# Patient Record
Sex: MALE | Race: WHITE | NOT HISPANIC OR LATINO | URBAN - METROPOLITAN AREA
[De-identification: names, ages, dates, MRNs, and addresses within clinical notes are randomized per-mention and may not be internally consistent; named-entity substitution may affect disease eponyms.]

---

## 2019-01-29 ENCOUNTER — APPOINTMENT (OUTPATIENT)
Dept: RADIOLOGY | Facility: MEDICAL CENTER | Age: 44
End: 2019-01-29
Attending: EMERGENCY MEDICINE
Payer: MEDICAID

## 2019-01-29 ENCOUNTER — HOSPITAL ENCOUNTER (OUTPATIENT)
Facility: MEDICAL CENTER | Age: 44
End: 2019-02-01
Attending: EMERGENCY MEDICINE | Admitting: HOSPITALIST
Payer: MEDICAID

## 2019-01-29 DIAGNOSIS — R07.9 CHEST PAIN, UNSPECIFIED TYPE: ICD-10-CM

## 2019-01-29 LAB
ALBUMIN SERPL BCP-MCNC: 3.9 G/DL (ref 3.2–4.9)
ALBUMIN/GLOB SERPL: 1.5 G/DL
ALP SERPL-CCNC: 141 U/L (ref 30–99)
ALT SERPL-CCNC: 33 U/L (ref 2–50)
ANION GAP SERPL CALC-SCNC: 13 MMOL/L (ref 0–11.9)
APTT PPP: 26.7 SEC (ref 24.7–36)
AST SERPL-CCNC: 38 U/L (ref 12–45)
BASOPHILS # BLD AUTO: 0.8 % (ref 0–1.8)
BASOPHILS # BLD: 0.05 K/UL (ref 0–0.12)
BILIRUB SERPL-MCNC: 0.7 MG/DL (ref 0.1–1.5)
BUN SERPL-MCNC: 31 MG/DL (ref 8–22)
CALCIUM SERPL-MCNC: 8.7 MG/DL (ref 8.5–10.5)
CHLORIDE SERPL-SCNC: 100 MMOL/L (ref 96–112)
CO2 SERPL-SCNC: 20 MMOL/L (ref 20–33)
CREAT SERPL-MCNC: 1.06 MG/DL (ref 0.5–1.4)
EOSINOPHIL # BLD AUTO: 0.07 K/UL (ref 0–0.51)
EOSINOPHIL NFR BLD: 1.1 % (ref 0–6.9)
ERYTHROCYTE [DISTWIDTH] IN BLOOD BY AUTOMATED COUNT: 43.8 FL (ref 35.9–50)
GLOBULIN SER CALC-MCNC: 2.6 G/DL (ref 1.9–3.5)
GLUCOSE SERPL-MCNC: 213 MG/DL (ref 65–99)
HCT VFR BLD AUTO: 48.8 % (ref 42–52)
HGB BLD-MCNC: 15.8 G/DL (ref 14–18)
IMM GRANULOCYTES # BLD AUTO: 0.01 K/UL (ref 0–0.11)
IMM GRANULOCYTES NFR BLD AUTO: 0.2 % (ref 0–0.9)
INR PPP: 1.36 (ref 0.87–1.13)
LIPASE SERPL-CCNC: 40 U/L (ref 11–82)
LYMPHOCYTES # BLD AUTO: 1.85 K/UL (ref 1–4.8)
LYMPHOCYTES NFR BLD: 29.5 % (ref 22–41)
MCH RBC QN AUTO: 31.2 PG (ref 27–33)
MCHC RBC AUTO-ENTMCNC: 32.4 G/DL (ref 33.7–35.3)
MCV RBC AUTO: 96.3 FL (ref 81.4–97.8)
MONOCYTES # BLD AUTO: 0.7 K/UL (ref 0–0.85)
MONOCYTES NFR BLD AUTO: 11.1 % (ref 0–13.4)
NEUTROPHILS # BLD AUTO: 3.6 K/UL (ref 1.82–7.42)
NEUTROPHILS NFR BLD: 57.3 % (ref 44–72)
NRBC # BLD AUTO: 0 K/UL
NRBC BLD-RTO: 0 /100 WBC
PLATELET # BLD AUTO: 184 K/UL (ref 164–446)
PMV BLD AUTO: 10.2 FL (ref 9–12.9)
POTASSIUM SERPL-SCNC: 4.5 MMOL/L (ref 3.6–5.5)
PROT SERPL-MCNC: 6.5 G/DL (ref 6–8.2)
PROTHROMBIN TIME: 16.9 SEC (ref 12–14.6)
RBC # BLD AUTO: 5.07 M/UL (ref 4.7–6.1)
SODIUM SERPL-SCNC: 133 MMOL/L (ref 135–145)
TROPONIN I SERPL-MCNC: 0.01 NG/ML (ref 0–0.04)
WBC # BLD AUTO: 6.3 K/UL (ref 4.8–10.8)

## 2019-01-29 PROCEDURE — 99285 EMERGENCY DEPT VISIT HI MDM: CPT

## 2019-01-29 PROCEDURE — 83880 ASSAY OF NATRIURETIC PEPTIDE: CPT

## 2019-01-29 PROCEDURE — 85025 COMPLETE CBC W/AUTO DIFF WBC: CPT

## 2019-01-29 PROCEDURE — 85730 THROMBOPLASTIN TIME PARTIAL: CPT

## 2019-01-29 PROCEDURE — 80053 COMPREHEN METABOLIC PANEL: CPT

## 2019-01-29 PROCEDURE — 85610 PROTHROMBIN TIME: CPT

## 2019-01-29 PROCEDURE — 93005 ELECTROCARDIOGRAM TRACING: CPT

## 2019-01-29 PROCEDURE — 84484 ASSAY OF TROPONIN QUANT: CPT

## 2019-01-29 PROCEDURE — 93005 ELECTROCARDIOGRAM TRACING: CPT | Performed by: EMERGENCY MEDICINE

## 2019-01-29 PROCEDURE — 71045 X-RAY EXAM CHEST 1 VIEW: CPT

## 2019-01-29 PROCEDURE — 83690 ASSAY OF LIPASE: CPT

## 2019-01-29 ASSESSMENT — LIFESTYLE VARIABLES: DO YOU DRINK ALCOHOL: NO

## 2019-01-29 ASSESSMENT — PAIN DESCRIPTION - DESCRIPTORS
DESCRIPTORS: PRESSURE
DESCRIPTORS: PRESSURE

## 2019-01-30 ENCOUNTER — APPOINTMENT (OUTPATIENT)
Dept: RADIOLOGY | Facility: MEDICAL CENTER | Age: 44
End: 2019-01-30
Attending: INTERNAL MEDICINE
Payer: MEDICAID

## 2019-01-30 PROBLEM — E78.5 HLD (HYPERLIPIDEMIA): Status: ACTIVE | Noted: 2019-01-30

## 2019-01-30 PROBLEM — I95.9 HYPOTENSION: Status: ACTIVE | Noted: 2019-01-30

## 2019-01-30 PROBLEM — R07.9 PAIN IN THE CHEST: Status: ACTIVE | Noted: 2019-01-30

## 2019-01-30 PROBLEM — I50.9 CHF (CONGESTIVE HEART FAILURE) (HCC): Status: ACTIVE | Noted: 2019-01-30

## 2019-01-30 LAB
BNP SERPL-MCNC: 3438 PG/ML (ref 0–100)
EKG IMPRESSION: NORMAL
GLUCOSE BLD-MCNC: 210 MG/DL (ref 65–99)
GLUCOSE BLD-MCNC: 273 MG/DL (ref 65–99)
GLUCOSE BLD-MCNC: 317 MG/DL (ref 65–99)
TROPONIN I SERPL-MCNC: 0.01 NG/ML (ref 0–0.04)
TROPONIN I SERPL-MCNC: <0.01 NG/ML (ref 0–0.04)

## 2019-01-30 PROCEDURE — G0378 HOSPITAL OBSERVATION PER HR: HCPCS

## 2019-01-30 PROCEDURE — 96372 THER/PROPH/DIAG INJ SC/IM: CPT

## 2019-01-30 PROCEDURE — A9270 NON-COVERED ITEM OR SERVICE: HCPCS | Performed by: HOSPITALIST

## 2019-01-30 PROCEDURE — 93005 ELECTROCARDIOGRAM TRACING: CPT | Performed by: HOSPITALIST

## 2019-01-30 PROCEDURE — 82962 GLUCOSE BLOOD TEST: CPT | Mod: 91

## 2019-01-30 PROCEDURE — A9270 NON-COVERED ITEM OR SERVICE: HCPCS | Performed by: INTERNAL MEDICINE

## 2019-01-30 PROCEDURE — 84484 ASSAY OF TROPONIN QUANT: CPT

## 2019-01-30 PROCEDURE — 36415 COLL VENOUS BLD VENIPUNCTURE: CPT

## 2019-01-30 PROCEDURE — 700102 HCHG RX REV CODE 250 W/ 637 OVERRIDE(OP): Performed by: HOSPITALIST

## 2019-01-30 PROCEDURE — 99220 PR INITIAL OBSERVATION CARE,LEVL III: CPT | Performed by: HOSPITALIST

## 2019-01-30 PROCEDURE — 71275 CT ANGIOGRAPHY CHEST: CPT

## 2019-01-30 PROCEDURE — 700102 HCHG RX REV CODE 250 W/ 637 OVERRIDE(OP): Performed by: INTERNAL MEDICINE

## 2019-01-30 PROCEDURE — 700117 HCHG RX CONTRAST REV CODE 255: Performed by: INTERNAL MEDICINE

## 2019-01-30 RX ORDER — POLYETHYLENE GLYCOL 3350 17 G/17G
1 POWDER, FOR SOLUTION ORAL
Status: DISCONTINUED | OUTPATIENT
Start: 2019-01-30 | End: 2019-02-01 | Stop reason: HOSPADM

## 2019-01-30 RX ORDER — SPIRONOLACTONE 25 MG/1
12.5-25 TABLET ORAL
COMMUNITY

## 2019-01-30 RX ORDER — ATORVASTATIN CALCIUM 10 MG/1
10 TABLET, FILM COATED ORAL NIGHTLY
COMMUNITY

## 2019-01-30 RX ORDER — BISACODYL 10 MG
10 SUPPOSITORY, RECTAL RECTAL
Status: DISCONTINUED | OUTPATIENT
Start: 2019-01-30 | End: 2019-02-01 | Stop reason: HOSPADM

## 2019-01-30 RX ORDER — CARVEDILOL 3.12 MG/1
3.12 TABLET ORAL 2 TIMES DAILY WITH MEALS
COMMUNITY

## 2019-01-30 RX ORDER — FUROSEMIDE 40 MG/1
40 TABLET ORAL
COMMUNITY

## 2019-01-30 RX ORDER — DEXTROSE MONOHYDRATE 25 G/50ML
25 INJECTION, SOLUTION INTRAVENOUS
Status: DISCONTINUED | OUTPATIENT
Start: 2019-01-30 | End: 2019-02-01 | Stop reason: HOSPADM

## 2019-01-30 RX ORDER — ATORVASTATIN CALCIUM 80 MG/1
80 TABLET, FILM COATED ORAL EVERY EVENING
Status: DISCONTINUED | OUTPATIENT
Start: 2019-01-30 | End: 2019-02-01 | Stop reason: HOSPADM

## 2019-01-30 RX ORDER — NITROGLYCERIN 0.4 MG/1
0.4 TABLET SUBLINGUAL
Status: DISCONTINUED | OUTPATIENT
Start: 2019-01-30 | End: 2019-02-01 | Stop reason: HOSPADM

## 2019-01-30 RX ORDER — POTASSIUM CHLORIDE 20 MEQ/1
20 TABLET, EXTENDED RELEASE ORAL 2 TIMES DAILY
Status: ON HOLD | COMMUNITY
End: 2019-01-31

## 2019-01-30 RX ORDER — MORPHINE SULFATE 4 MG/ML
2-4 INJECTION, SOLUTION INTRAMUSCULAR; INTRAVENOUS
Status: DISCONTINUED | OUTPATIENT
Start: 2019-01-30 | End: 2019-02-01 | Stop reason: HOSPADM

## 2019-01-30 RX ORDER — AMOXICILLIN 250 MG
2 CAPSULE ORAL 2 TIMES DAILY
Status: DISCONTINUED | OUTPATIENT
Start: 2019-01-30 | End: 2019-02-01 | Stop reason: HOSPADM

## 2019-01-30 RX ADMIN — RIVAROXABAN 15 MG: 15 TABLET, FILM COATED ORAL at 14:31

## 2019-01-30 RX ADMIN — IOHEXOL 100 ML: 350 INJECTION, SOLUTION INTRAVENOUS at 13:47

## 2019-01-30 RX ADMIN — SENNOSIDES AND DOCUSATE SODIUM 2 TABLET: 8.6; 5 TABLET ORAL at 20:46

## 2019-01-30 RX ADMIN — INSULIN HUMAN 3 UNITS: 100 INJECTION, SOLUTION PARENTERAL at 17:00

## 2019-01-30 RX ADMIN — ATORVASTATIN CALCIUM 80 MG: 80 TABLET, FILM COATED ORAL at 17:59

## 2019-01-30 RX ADMIN — INSULIN HUMAN 4 UNITS: 100 INJECTION, SOLUTION PARENTERAL at 13:16

## 2019-01-30 RX ADMIN — INSULIN HUMAN 2 UNITS: 100 INJECTION, SOLUTION PARENTERAL at 20:50

## 2019-01-30 ASSESSMENT — ENCOUNTER SYMPTOMS
PALPITATIONS: 0
MYALGIAS: 0
PHOTOPHOBIA: 0
FEVER: 0
WHEEZING: 0
FOCAL WEAKNESS: 0
ABDOMINAL PAIN: 0
CHILLS: 0
DIARRHEA: 0
ROS GI COMMENTS: ABDOMINAL BLOATING
DIZZINESS: 0
TINGLING: 0
NAUSEA: 0
COUGH: 1
VOMITING: 0
HEADACHES: 0
SHORTNESS OF BREATH: 1
SORE THROAT: 0
DEPRESSION: 0

## 2019-01-30 ASSESSMENT — PATIENT HEALTH QUESTIONNAIRE - PHQ9
7. TROUBLE CONCENTRATING ON THINGS, SUCH AS READING THE NEWSPAPER OR WATCHING TELEVISION: SEVERAL DAYS
2. FEELING DOWN, DEPRESSED, IRRITABLE, OR HOPELESS: NEARLY EVERY DAY
9. THOUGHTS THAT YOU WOULD BE BETTER OFF DEAD, OR OF HURTING YOURSELF: NOT AT ALL
6. FEELING BAD ABOUT YOURSELF - OR THAT YOU ARE A FAILURE OR HAVE LET YOURSELF OR YOUR FAMILY DOWN: NEARLY EVERY DAY
4. FEELING TIRED OR HAVING LITTLE ENERGY: NEARLY EVERY DAY
5. POOR APPETITE OR OVEREATING: MORE THAN HALF THE DAYS
SUM OF ALL RESPONSES TO PHQ QUESTIONS 1-9: 21
3. TROUBLE FALLING OR STAYING ASLEEP OR SLEEPING TOO MUCH: NEARLY EVERY DAY
1. LITTLE INTEREST OR PLEASURE IN DOING THINGS: NEARLY EVERY DAY
SUM OF ALL RESPONSES TO PHQ9 QUESTIONS 1 AND 2: 6
8. MOVING OR SPEAKING SO SLOWLY THAT OTHER PEOPLE COULD HAVE NOTICED. OR THE OPPOSITE, BEING SO FIGETY OR RESTLESS THAT YOU HAVE BEEN MOVING AROUND A LOT MORE THAN USUAL: NEARLY EVERY DAY

## 2019-01-30 ASSESSMENT — LIFESTYLE VARIABLES: EVER_SMOKED: YES

## 2019-01-30 NOTE — ED NOTES
Pt to Room T833 with Anya OTERO.  Pt belongings sent with patient, up on 2L NC and cardiac monitor.  Pt well appearing, nad.

## 2019-01-30 NOTE — ED NOTES
Pt reports taking metformin BID 1000 mg , recent accucheck 273.  To call pharmacy/MD Regarding medication regimen.   Pt resting in cart, resp even and unlabored, skin w/d/i.

## 2019-01-30 NOTE — ASSESSMENT & PLAN NOTE
To be hypotensive on admission and diuretics were held.  We will restart Lasix as blood pressure is stable  He has evidence of peritoneal and pleural effusion, mild, as well as lower extremity edema, mild  CHF does not appear to be in exacerbation  Pending echo and outside cardiology records  Patient  will need to follow-up with cardiology at [place] after discharge

## 2019-01-30 NOTE — ED NOTES
Inpatient Hospitalist RN at bedside to examine patient.  EKG IP at bedside per Q4H inpatient order.  Pt resting in cart, no change in condition.

## 2019-01-30 NOTE — PROGRESS NOTES
43-year-old male admitted to the hospital this morning with complaint of chest pain.  I evaluated and examined him at the bedside.  He reported that his chest pain has improved and he had a stress test done 2 weeks ago and [facility].  He has AICD placement.  He reported shortness of breath and found to have hypoxia currently on 2 L oxygen.  I ordered CTA pulmonary and he found to have bilateral pulmonary embolism.  I started him on Xarelto.  I requested outside medical record so we can evaluate him further.  His current troponin x2 is negative so far.  He is outpatient aspirin and I discontinued it as I started him on Xarelto.

## 2019-01-30 NOTE — ED NOTES
Hospitalist at bedside.  Pt updated on plan of care (CTPE).  Insulin administered subcut verified with second RN Wayne per protocol.  Pt has no other complaints at present.

## 2019-01-30 NOTE — PROGRESS NOTES
Attending Hospitalist today is  starting at 0700. Please call this physician for orders, updates and questions. Thank you.

## 2019-01-30 NOTE — ED NOTES
Med rec complete per pt's rx bottles at bedside and [facility] to verify lipitor strength. meds returned to pt. He will give medications to family once they arrive to the hospital. DEVENDRA.

## 2019-01-30 NOTE — ASSESSMENT & PLAN NOTE
History of alcoholic cardiomyopathy, AICD implantation  CTA of pulmonary artery positive for PE  Initially hypoxic, resolved.  We will follow with heart ultrasound to make further determination of the management plan  Currently chest pain-free  Unlikely CAD.  Unclear if he had angiography done.  Will await outside medical records faxed  Nitro and morphine as needed for pain

## 2019-01-30 NOTE — H&P
Hospital Medicine History & Physical Note    Date of Service  1/30/2019    Primary Care Physician  No primary care provider on file.    Consultants  None    Code Status  Full    Chief Complaint  Chief Complaint   Patient presents with   • Chest Pressure     heaviness    • Abdominal Swelling   • Shortness of Breath       History of Presenting Illness  43 y.o. male who presented on 1/29/2019 with shortness of breath, chest heaviness, and abdominal bloating.  I do not have any records to confirm however patient states that he has a history of alcoholic cardiomyopathy with associated congestive heart failure and AICD implantation.  He states that for the last several weeks, he has had a centralized chest pressure which has been radiating toward his back and left arm.  He reportedly was seen at [facility] 2 weeks ago and underwent a stress test which was normal.  He states that despite this, his symptoms persisted therefore he returned to [facility] in the last several days and was discharged from the emergency room.  He describes a centralized chest pressure which is nonradiating currently, associated with shortness of breath and diaphoresis but no nausea, lightheadedness, or palpitations.  The patient reports worsening swelling of his legs and feet since he was started on Entresto and that he has had some abdominal bloating.  Upon assessment in the emergency room, the patient is noted to be hypotensive.    Review of Systems  Review of Systems   Constitutional: Negative for chills and fever.   HENT: Negative for congestion and sore throat.    Eyes: Negative for photophobia.   Respiratory: Positive for cough and shortness of breath. Negative for wheezing.    Cardiovascular: Positive for chest pain and leg swelling. Negative for palpitations.   Gastrointestinal: Negative for abdominal pain, diarrhea, nausea and vomiting.        Abdominal bloating   Genitourinary: Negative for dysuria.   Musculoskeletal:  Negative for myalgias.   Skin: Negative.    Neurological: Negative for dizziness, tingling, focal weakness and headaches.   Psychiatric/Behavioral: Negative for depression and suicidal ideas.       Past Medical History  Past Medical History:   Diagnosis Date   • AICD (automatic cardioverter/defibrillator) present    • CAD (coronary artery disease)    • CHF (congestive heart failure) (HCC)    • Diabetes (HCC)     type 2   • High cholesterol    • Hypertension        Surgical History  Past Surgical History:   Procedure Laterality Date   • AICD IMPLANT         Family History  History reviewed. No pertinent family history.    Social History  Social History   Substance Use Topics   • Smoking status: Former Smoker   • Smokeless tobacco: Never Used   • Alcohol use No      Comment: former       Allergies  No Known Allergies    Medications  No current facility-administered medications on file prior to encounter.      No current outpatient prescriptions on file prior to encounter.       Physical Exam  Hemodynamics  Temp (24hrs), Av.3 °C (97.3 °F), Min:36.3 °C (97.3 °F), Max:36.3 °C (97.3 °F)   Temperature: 36.3 °C (97.3 °F)  Pulse  Av.8  Min: 87  Max: 98 Heart Rate (Monitored): 94  Blood Pressure: 115/78, NIBP: (!) 93/72      Respiratory      Respiration: 18, Pulse Oximetry: 97 %             Physical Exam   Constitutional: He is oriented to person, place, and time. No distress.   HENT:   Head: Normocephalic and atraumatic.   Right Ear: External ear normal.   Left Ear: External ear normal.   Eyes: EOM are normal. Right eye exhibits no discharge. Left eye exhibits no discharge.   Neck: Neck supple. No JVD present.   Cardiovascular: Normal rate, regular rhythm and normal heart sounds.    Pulmonary/Chest: Effort normal and breath sounds normal. No respiratory distress. He exhibits no tenderness.   Abdominal: Soft. Bowel sounds are normal. He exhibits no distension. There is no tenderness.   Musculoskeletal: He exhibits no  edema.   Neurological: He is alert and oriented to person, place, and time. No cranial nerve deficit.   Skin: Skin is dry. He is not diaphoretic. No erythema.   Psychiatric: He has a normal mood and affect. His behavior is normal.   Nursing note and vitals reviewed.    Capillary refill less than 3 seconds, distal pulses intact    Laboratory:  Recent Labs      01/29/19 2247   WBC  6.3   RBC  5.07   HEMOGLOBIN  15.8   HEMATOCRIT  48.8   MCV  96.3   MCH  31.2   MCHC  32.4*   RDW  43.8   PLATELETCT  184   MPV  10.2     Recent Labs      01/29/19 2247   SODIUM  133*   POTASSIUM  4.5   CHLORIDE  100   CO2  20   GLUCOSE  213*   BUN  31*   CREATININE  1.06   CALCIUM  8.7     Recent Labs      01/29/19 2247   ALTSGPT  33   ASTSGOT  38   ALKPHOSPHAT  141*   TBILIRUBIN  0.7   LIPASE  40   GLUCOSE  213*     Recent Labs      01/29/19 2247   APTT  26.7   INR  1.36*     Recent Labs      01/29/19 2247   BNPBTYPENAT  3438*         Lab Results   Component Value Date    TROPONINI 0.01 01/29/2019       Imaging  Dx-chest-limited (1 View)    Result Date: 1/29/2019 1/29/2019 11:03 PM HISTORY/REASON FOR EXAM:  Chest Pain TECHNIQUE/EXAM DESCRIPTION AND NUMBER OF VIEWS: Single portable view of the chest. COMPARISON: None FINDINGS: Left-sided pacemaker defibrillator No pulmonary infiltrates or consolidations are noted. No pleural effusion. No pneumothorax. Mildly prominent cardiopericardial silhouette.     No acute cardiopulmonary abnormalities are identified.        Assessment/Plan:  Anticipate that patient will need less than 2 midnights for management of the discussed medical issues.    * Pain in the chest   Assessment & Plan    Patient was noted to be hypotensive at the time of presentation, he also states that he was seen at [facility] 2 times within the last several weeks and during 1 of his visits had undergone a stress test which was negative.  We are obtaining the records to confirm this.  He also states that he is followed  by Dr. Conde of cardiology at [place] for alcoholic cardiomyopathy and has related congestive heart failure with AICD implantation.  I do not have these records to confirm but again we are obtaining records from [place].  Since he just had a workup at the outside facility, I will plan to monitor him overnight on telemetry and correct his hypotension.  I will trend troponin levels and obtain serial EKGs.  I have started him on a statin and an aspirin and he will receive as needed morphine, oxygen, and nitroglycerin.  We will plan to call [facility] cardiology in the morning to see if anything further needs to be done for this patient.     CHF (congestive heart failure) (HCC)   Assessment & Plan    Holding all diuretics in light of hypotension, minimal edema noted on physical exam.     HLD (hyperlipidemia)   Assessment & Plan    Continue statin and aspirin.  Workup as noted above.     Hypotension   Assessment & Plan    Patient has been on diuretics as well as Entresto for his history of CHF.  He was hypotensive at the time of presentation.  He complains of abdominal bloating and lower extremity edema however this is minimal at best.  I am holding all antihypertensives for tonight and monitoring his blood pressure.  We will need to confirm his home medication list and then begin to resume his medications once his blood pressure has recovered.         Prophylaxis: Sequential compression devices for DVT prophylaxis, no PPI indicated, bowel protocol as needed

## 2019-01-30 NOTE — ED NOTES
Pt alert and oriented, on 2 L NC.   Resp even and unlabored, skin w/d.  Updated on bed assignment and  CT results.   Pharmacy at bedside to discuss heparin plan with RNjessy notify Hospitalist.

## 2019-01-30 NOTE — ED NOTES
Pt placed on 2L NC for comfort/intermittent pulse ox 86%. Pt reports feeling mildly SOB.  Hospitalist informed of patient missing metformin dose, requesting sliding scale insulin.   Pt alert/awake, nad. Call light within reach.

## 2019-01-30 NOTE — ED PROVIDER NOTES
ED Provider Note    CHIEF COMPLAINT  Chief Complaint   Patient presents with   • Chest Pressure     heaviness    • Abdominal Swelling   • Shortness of Breath       HPI  Michael Hassan is a 43 y.o. male who presents with chest pressure.  The patient states he said heaviness in his chest over the last several weeks.  He states it seems to be worse with exerting himself and he does have associated diaphoresis, nausea, and dyspnea.  The patient states he did have a stress test at [facility] a couple weeks ago that was negative.  He states he was diagnosed with an alcohol induced cardiomyopathy and has a known history of congestive heart failure.  He currently has a pacemaker in place.  He states he also has swelling to his lower extremities as well as some abdominal distention.  The patient states he does continue to abuse alcohol.  The patient states his pain is currently very mild.  He states the pain is in the substernal region and described as pressure.  He states that does have an exertional component and does get better with rest.  He also takes daily aspirin and did have aspirin prior to arrival.    REVIEW OF SYSTEMS  See HPI for further details. All other systems are negative.     PAST MEDICAL HISTORY  Past Medical History:   Diagnosis Date   • AICD (automatic cardioverter/defibrillator) present    • CAD (coronary artery disease)    • CHF (congestive heart failure) ([facility])    • Diabetes ([facility])     type 2   • High cholesterol    • Hypertension        FAMILY HISTORY  [unfilled]    SOCIAL HISTORY  Social History     Social History   • Marital status: Single     Spouse name: N/A   • Number of children: N/A   • Years of education: N/A     Social History Main Topics   • Smoking status: Former Smoker   • Smokeless tobacco: Never Used   • Alcohol use No      Comment: former   • Drug use: No   • Sexual activity: Not on file     Other Topics Concern   • Not on file     Social History Narrative   • No narrative on file        SURGICAL HISTORY  Past Surgical History:   Procedure Laterality Date   • AICD IMPLANT         CURRENT MEDICATIONS  Home Medications    **Home medications have not yet been reviewed for this encounter**         ALLERGIES  No Known Allergies    PHYSICAL EXAM  VITAL SIGNS: /78   Pulse 89   Temp 36.3 °C (97.3 °F) (Temporal)   Resp 20   Ht 1.524 m (5')   Wt 86 kg (189 lb 9.5 oz)   SpO2 95%   BMI 37.03 kg/m²  Room air O2: 99    Constitutional: Chronically ill in appearance  HENT: Normocephalic, Atraumatic, Bilateral external ears normal, Oropharynx moist, No oral exudates, Nose normal.   Eyes: PERRLA, EOMI, Conjunctiva normal, No discharge.   Neck: Normal range of motion, No tenderness, Supple, No stridor.   Lymphatic: No lymphadenopathy noted.   Cardiovascular: Normal heart rate, Normal rhythm, No murmurs, No rubs, No gallops.   Thorax & Lungs: Slightly diminished throughout, no respiratory distress, No wheezing, No chest tenderness.   Abdomen: Hypoactive bowel sounds, hepatomegaly, no rebound, no guarding  Skin: Warm, Dry, No erythema, No rash.   Back: No tenderness, No CVA tenderness.   Extremities: Intact distal pulses, slight lower extremity edema, No tenderness, No cyanosis, No clubbing.   Musculoskeletal: Good range of motion in all major joints. No tenderness to palpation or major deformities noted.   Neurologic: Alert & oriented x 3, Normal motor function, Normal sensory function, No focal deficits noted.   Psychiatric: Affect normal, Judgment normal, Mood normal.     EKG  Twelve-lead EKG interpreted by myself shows a normal sinus rhythm with a ventricular rate of 98, QRS is poor R wave progression, no ST segment elevation or depression, T waves inverted laterally in 1 and aVL    RADIOLOGY/PROCEDURES  DX-CHEST-LIMITED (1 VIEW)   Final Result         No acute cardiopulmonary abnormalities are identified.            COURSE & MEDICAL DECISION MAKING  Pertinent Labs & Imaging studies reviewed. (See  chart for details)  This a 43-year-old male who presents with chest pain.  The patient does have significant cardiac risk factors and already has a known alcohol induced cardiomyopathy.  The EKG and troponin does not support ischemia.  He does have a BNP of 3438 which is concerning from a heart failure standpoint.  However the patient's blood pressure is only 88 systolic therefore be difficult to manage his volume state.  The patient states he does make urine.  His BUN and creatinine is slightly elevated as well.  As for his abdominal distention his abdomen is benign therefore surgical source would be unlikely.  I suspect he does have a mild amount of ascites.  He also some lower extremity edema which goes along with his heart failure.  The patient be admitted to the hospital for an echocardiogram and further cardiac rule out.  He is currently resting comfortably.  Otherwise the chest x-ray does not show any evidence of a focal process such as pneumonia.    FINAL IMPRESSION  1.  Chest pain rule out acute coronary syndrome  2.  Congestive heart failure exacerbation  3.  Uncontrolled diabetes mellitus    Disposition  The patient will be admitted in stable condition         Electronically signed by: Vadim Glaser, 1/30/2019 12:01 AM

## 2019-01-30 NOTE — ED TRIAGE NOTES
Benji Hassan  43 y.o.  male  Chief Complaint   Patient presents with   • Chest Pressure     heaviness    • Abdominal Swelling   • Shortness of Breath     Present to triage c/o chest heaviness since Friday and abdominal and lower extremity swelling / edema. Hx CHF. Patient on AICD. Patient was admitted at [facility] 10 days ago and was DC'd a week ago. Came back to [facility] last Wednesday due to left sided chest pain that radiates to left arm but was DC's in ER stated that all labs are fine.

## 2019-01-31 ENCOUNTER — APPOINTMENT (OUTPATIENT)
Dept: RADIOLOGY | Facility: MEDICAL CENTER | Age: 44
End: 2019-01-31
Attending: INTERNAL MEDICINE
Payer: MEDICAID

## 2019-01-31 ENCOUNTER — APPOINTMENT (OUTPATIENT)
Dept: CARDIOLOGY | Facility: MEDICAL CENTER | Age: 44
End: 2019-01-31
Attending: INTERNAL MEDICINE
Payer: MEDICAID

## 2019-01-31 PROBLEM — R14.0 ABDOMINAL DISTENTION: Status: ACTIVE | Noted: 2019-01-31

## 2019-01-31 PROBLEM — J96.01 ACUTE RESPIRATORY FAILURE WITH HYPOXIA (HCC): Status: ACTIVE | Noted: 2019-01-31

## 2019-01-31 PROBLEM — I26.99 ACUTE PULMONARY EMBOLISM (HCC): Status: ACTIVE | Noted: 2019-01-31

## 2019-01-31 LAB
ANION GAP SERPL CALC-SCNC: 8 MMOL/L (ref 0–11.9)
BUN SERPL-MCNC: 21 MG/DL (ref 8–22)
CALCIUM SERPL-MCNC: 9.5 MG/DL (ref 8.5–10.5)
CHLORIDE SERPL-SCNC: 100 MMOL/L (ref 96–112)
CO2 SERPL-SCNC: 25 MMOL/L (ref 20–33)
CREAT SERPL-MCNC: 0.92 MG/DL (ref 0.5–1.4)
EKG IMPRESSION: NORMAL
EKG IMPRESSION: NORMAL
GLUCOSE BLD-MCNC: 155 MG/DL (ref 65–99)
GLUCOSE BLD-MCNC: 233 MG/DL (ref 65–99)
GLUCOSE BLD-MCNC: 244 MG/DL (ref 65–99)
GLUCOSE BLD-MCNC: 250 MG/DL (ref 65–99)
GLUCOSE SERPL-MCNC: 130 MG/DL (ref 65–99)
LV EJECT FRACT  99904: 15
LV EJECT FRACT MOD 2C 99903: 9.26
LV EJECT FRACT MOD 4C 99902: 21.83
LV EJECT FRACT MOD BP 99901: 15.68
MAGNESIUM SERPL-MCNC: 2.1 MG/DL (ref 1.5–2.5)
POTASSIUM SERPL-SCNC: 4.4 MMOL/L (ref 3.6–5.5)
SODIUM SERPL-SCNC: 133 MMOL/L (ref 135–145)

## 2019-01-31 PROCEDURE — 93306 TTE W/DOPPLER COMPLETE: CPT

## 2019-01-31 PROCEDURE — 93010 ELECTROCARDIOGRAM REPORT: CPT | Performed by: INTERNAL MEDICINE

## 2019-01-31 PROCEDURE — 82962 GLUCOSE BLOOD TEST: CPT | Mod: 91

## 2019-01-31 PROCEDURE — 80048 BASIC METABOLIC PNL TOTAL CA: CPT

## 2019-01-31 PROCEDURE — 700102 HCHG RX REV CODE 250 W/ 637 OVERRIDE(OP): Performed by: INTERNAL MEDICINE

## 2019-01-31 PROCEDURE — A9270 NON-COVERED ITEM OR SERVICE: HCPCS | Performed by: INTERNAL MEDICINE

## 2019-01-31 PROCEDURE — 51798 US URINE CAPACITY MEASURE: CPT

## 2019-01-31 PROCEDURE — 36415 COLL VENOUS BLD VENIPUNCTURE: CPT

## 2019-01-31 PROCEDURE — 99233 SBSQ HOSP IP/OBS HIGH 50: CPT | Performed by: INTERNAL MEDICINE

## 2019-01-31 PROCEDURE — 93005 ELECTROCARDIOGRAM TRACING: CPT | Performed by: INTERNAL MEDICINE

## 2019-01-31 PROCEDURE — 96372 THER/PROPH/DIAG INJ SC/IM: CPT

## 2019-01-31 PROCEDURE — 700102 HCHG RX REV CODE 250 W/ 637 OVERRIDE(OP): Performed by: HOSPITALIST

## 2019-01-31 PROCEDURE — 93306 TTE W/DOPPLER COMPLETE: CPT | Mod: 26 | Performed by: INTERNAL MEDICINE

## 2019-01-31 PROCEDURE — G0378 HOSPITAL OBSERVATION PER HR: HCPCS

## 2019-01-31 PROCEDURE — 83735 ASSAY OF MAGNESIUM: CPT

## 2019-01-31 PROCEDURE — 76700 US EXAM ABDOM COMPLETE: CPT

## 2019-01-31 PROCEDURE — A9270 NON-COVERED ITEM OR SERVICE: HCPCS | Performed by: HOSPITALIST

## 2019-01-31 RX ORDER — CARVEDILOL 3.12 MG/1
3.12 TABLET ORAL 2 TIMES DAILY WITH MEALS
Status: DISCONTINUED | OUTPATIENT
Start: 2019-01-31 | End: 2019-02-01 | Stop reason: HOSPADM

## 2019-01-31 RX ORDER — FUROSEMIDE 40 MG/1
40 TABLET ORAL
Status: DISCONTINUED | OUTPATIENT
Start: 2019-01-31 | End: 2019-02-01 | Stop reason: HOSPADM

## 2019-01-31 RX ADMIN — ATORVASTATIN CALCIUM 80 MG: 80 TABLET, FILM COATED ORAL at 18:31

## 2019-01-31 RX ADMIN — FUROSEMIDE 40 MG: 40 TABLET ORAL at 11:55

## 2019-01-31 RX ADMIN — INSULIN HUMAN 2 UNITS: 100 INJECTION, SOLUTION PARENTERAL at 18:38

## 2019-01-31 RX ADMIN — INSULIN HUMAN 2 UNITS: 100 INJECTION, SOLUTION PARENTERAL at 21:48

## 2019-01-31 RX ADMIN — CARVEDILOL 3.12 MG: 3.12 TABLET, FILM COATED ORAL at 18:31

## 2019-01-31 RX ADMIN — RIVAROXABAN 15 MG: 15 TABLET, FILM COATED ORAL at 18:31

## 2019-01-31 RX ADMIN — RIVAROXABAN 15 MG: 15 TABLET, FILM COATED ORAL at 05:46

## 2019-01-31 RX ADMIN — INSULIN HUMAN 2 UNITS: 100 INJECTION, SOLUTION PARENTERAL at 11:58

## 2019-01-31 ASSESSMENT — ENCOUNTER SYMPTOMS
TINGLING: 0
HEARTBURN: 0
BLURRED VISION: 0
PALPITATIONS: 0
HEMOPTYSIS: 0
ABDOMINAL PAIN: 1
ORTHOPNEA: 0
DIZZINESS: 0
WEIGHT LOSS: 0
DOUBLE VISION: 0
BRUISES/BLEEDS EASILY: 0
BACK PAIN: 0
HEADACHES: 0
DEPRESSION: 0
MYALGIAS: 0
VOMITING: 0
FEVER: 0
CHILLS: 0
NECK PAIN: 0
NAUSEA: 0
PHOTOPHOBIA: 0
COUGH: 0
POLYDIPSIA: 0
SHORTNESS OF BREATH: 1
SPUTUM PRODUCTION: 0
WHEEZING: 0

## 2019-01-31 ASSESSMENT — LIFESTYLE VARIABLES: SUBSTANCE_ABUSE: 0

## 2019-01-31 NOTE — ASSESSMENT & PLAN NOTE
Secondary to above.  Improving.  Currently on room air.  Continue Lasix and incentive spirometry.  Xarelto for PE

## 2019-01-31 NOTE — ASSESSMENT & PLAN NOTE
Probably secondary to worsening of ascites, secondary to cirrhosis and/or CHF  Ordered abdominal ultrasound

## 2019-01-31 NOTE — ASSESSMENT & PLAN NOTE
LV/RV ratio 0.9  Will await heart ultrasound to evaluate for right ventricular strain  Started on Xarelto.  Preauthorization pending  Hypoxia resolved

## 2019-01-31 NOTE — PROGRESS NOTES
[facility] Medicine Daily Progress Note    Date of Service  1/31/2019    Chief Complaint  43 y.o. male with distant history of alcohol abuse, alcoholic cardiomyopathy admitted 1/29/2019 with complaints of shortness of breath, chest pressure, abdominal swelling, leg swelling      Interval Problem Update  Patient was diagnosed with PE yesterday.  Started on Xarelto.  Heart ultrasound is currently pending.  We do not know previous ejection fraction, as patient was followed at [place] cardiology.  Currently waiting to get medical papers from [facility]  Currently denies chest pressure.  Hypoxia on admission appears to be resolved by now.  Transient tachycardia was noted.  Will order repeat EKG.  Xarelto prescriptions sent out for preauthorization  Patient expressed concern about bilateral flank discomfort and abdominal distention.  According to him he has history of alcoholic liver cirrhosis.  Ordered abdominal ultrasound to assess for ascites or other abnormality      Consultants/Specialty  None    Code Status  Full code    Disposition  Home when medically clear    Review of Systems  Review of Systems   Constitutional: Negative for chills, fever and weight loss.   HENT: Negative for ear pain, hearing loss and tinnitus.    Eyes: Negative for blurred vision, double vision and photophobia.   Respiratory: Positive for shortness of breath. Negative for cough, hemoptysis, sputum production and wheezing.    Cardiovascular: Positive for chest pain and leg swelling. Negative for palpitations and orthopnea.   Gastrointestinal: Positive for abdominal pain. Negative for heartburn, nausea and vomiting.   Genitourinary: Negative for dysuria, frequency and urgency.   Musculoskeletal: Negative for back pain, myalgias and neck pain.   Skin: Negative for itching and rash.   Neurological: Negative for dizziness, tingling and headaches.   Endo/Heme/Allergies: Negative for environmental allergies and polydipsia. Does not bruise/bleed  easily.   Psychiatric/Behavioral: Negative for depression, substance abuse and suicidal ideas.        Physical Exam  Temp:  [36.6 °C (97.9 °F)-36.8 °C (98.2 °F)] 36.6 °C (97.9 °F)  Pulse:  [] 114  Resp:  [16-20] 20  BP: (100-131)/(74-95) 131/95  SpO2:  [94 %-98 %] 94 %    Physical Exam   Constitutional: He is oriented to person, place, and time. He appears well-developed.   HENT:   Head: Normocephalic and atraumatic.   Eyes: Pupils are equal, round, and reactive to light. EOM are normal.   Neck: Normal range of motion. Neck supple.   Cardiovascular: Normal rate.    Pulmonary/Chest: Effort normal. No respiratory distress. He has decreased breath sounds.   Abdominal: Soft. He exhibits distension. There is tenderness. There is no rebound and no guarding.   Musculoskeletal: Normal range of motion. He exhibits edema.   Neurological: He is alert and oriented to person, place, and time.   Skin: Skin is dry.   Psychiatric: His mood appears anxious. He exhibits a depressed mood.   Nursing note and vitals reviewed.      Fluids    Intake/Output Summary (Last 24 hours) at 01/31/19 1542  Last data filed at 01/31/19 0900   Gross per 24 hour   Intake              240 ml   Output                0 ml   Net              240 ml       Laboratory  Recent Labs      01/29/19 2247   WBC  6.3   RBC  5.07   HEMOGLOBIN  15.8   HEMATOCRIT  48.8   MCV  96.3   MCH  31.2   MCHC  32.4*   RDW  43.8   PLATELETCT  184   MPV  10.2     Recent Labs      01/29/19 2247 01/31/19   0234   SODIUM  133*  133*   POTASSIUM  4.5  4.4   CHLORIDE  100  100   CO2  20  25   GLUCOSE  213*  130*   BUN  31*  21   CREATININE  1.06  0.92   CALCIUM  8.7  9.5     Recent Labs      01/29/19 2247   APTT  26.7   INR  1.36*     Recent Labs      01/29/19 2247   BNPBTYPENAT  3438*           Imaging  CT-CTA CHEST PULMONARY ARTERY W/ RECONS   Final Result   Addendum 1 of 1   These findings were discussed with SHAKEEL HARRIS on 1/30/2019 2:06    PM.      Final       1.  Small bilateral lower lobe pulmonary emboli in the posterior basal subsegmental pulmonary artery branches.   2.  Small bilateral pleural effusions with associated atelectasis, worse in the LEFT.   3.  LEFT ventricular enlargement.   4.  Pacemaker in place.   5.  Mild mediastinal adenopathy, nonspecific.   6.  Small amount of ascites noted in the upper abdomen.            DX-CHEST-LIMITED (1 VIEW)   Final Result         No acute cardiopulmonary abnormalities are identified.      US-ABDOMEN COMPLETE SURVEY    (Results Pending)   EC-ECHOCARDIOGRAM COMPLETE W/O CONT    (Results Pending)        Assessment/Plan  * Pain in the chest   Assessment & Plan    History of alcoholic cardiomyopathy, AICD implantation  CTA of pulmonary artery positive for PE  Initially hypoxic, resolved.  We will follow with heart ultrasound to make further determination of the management plan  Currently chest pain-free  Unlikely CAD.  Unclear if he had angiography done.  Will await outside medical records faxed  Nitro and morphine as needed for pain     Acute respiratory failure with hypoxia (HCC)   Assessment & Plan    Secondary to above.  Improving.  Currently on room air.  Continue Lasix and incentive spirometry.  Xarelto for PE     Acute pulmonary embolism (HCC)   Assessment & Plan    LV/RV ratio 0.9  Will await heart ultrasound to evaluate for right ventricular strain  Started on Xarelto.  Preauthorization pending  Hypoxia resolved     Abdominal distention   Assessment & Plan    Probably secondary to worsening of ascites, secondary to cirrhosis and/or CHF  Ordered abdominal ultrasound     CHF (congestive heart failure) (HCC)   Assessment & Plan    To be hypotensive on admission and diuretics were held.  We will restart Lasix as blood pressure is stable  He has evidence of peritoneal and pleural effusion, mild, as well as lower extremity edema, mild  CHF does not appear to be in exacerbation  Pending echo and outside cardiology  records  Patient  will need to follow-up with cardiology at [place] after discharge     HLD (hyperlipidemia)   Assessment & Plan    Continue statin     Hypotension   Assessment & Plan    Blood pressure stable.  Medications restarted  Monitor blood pressure          VTE prophylaxis: On Xarelto

## 2019-01-31 NOTE — CARE PLAN
Problem: Communication  Goal: The ability to communicate needs accurately and effectively will improve  Outcome: PROGRESSING AS EXPECTED    Intervention: [place] patient and significant other/support system to call light to alert staff of needs   01/30/19 5517   OTHER   Oriented to: All of the Following : Location of Bathroom, Visiting Policy, Unit Routine, Call Light and Bedside Controls, Bedside Rail Policy, Smoking Policy, Rights and Responsibilities, Bedside Report, and Patient Education Notebook         Problem: Knowledge Deficit  Goal: Knowledge of disease process/condition, treatment plan, diagnostic tests, and medications will improve  Outcome: PROGRESSING AS EXPECTED  Patient educated about disease process and plan of care for the shift. Patient expressed understanding. All questions answered at this time.

## 2019-02-01 ENCOUNTER — APPOINTMENT (OUTPATIENT)
Dept: RADIOLOGY | Facility: MEDICAL CENTER | Age: 44
End: 2019-02-01
Attending: HOSPITALIST
Payer: MEDICAID

## 2019-02-01 ENCOUNTER — PATIENT OUTREACH (OUTPATIENT)
Dept: HEALTH INFORMATION MANAGEMENT | Facility: OTHER | Age: 44
End: 2019-02-01

## 2019-02-01 VITALS
WEIGHT: 186.51 LBS | HEIGHT: 60 IN | RESPIRATION RATE: 15 BRPM | TEMPERATURE: 98.7 F | HEART RATE: 96 BPM | SYSTOLIC BLOOD PRESSURE: 109 MMHG | BODY MASS INDEX: 36.62 KG/M2 | DIASTOLIC BLOOD PRESSURE: 83 MMHG | OXYGEN SATURATION: 93 %

## 2019-02-01 LAB
ALBUMIN SERPL BCP-MCNC: 3.6 G/DL (ref 3.2–4.9)
ALBUMIN/GLOB SERPL: 1.4 G/DL
ALP SERPL-CCNC: 141 U/L (ref 30–99)
ALT SERPL-CCNC: 25 U/L (ref 2–50)
ANION GAP SERPL CALC-SCNC: 9 MMOL/L (ref 0–11.9)
APPEARANCE UR: CLEAR
AST SERPL-CCNC: 28 U/L (ref 12–45)
BACTERIA #/AREA URNS HPF: NEGATIVE /HPF
BASOPHILS # BLD AUTO: 0.6 % (ref 0–1.8)
BASOPHILS # BLD: 0.03 K/UL (ref 0–0.12)
BILIRUB SERPL-MCNC: 0.7 MG/DL (ref 0.1–1.5)
BILIRUB UR QL STRIP.AUTO: NEGATIVE
BUN SERPL-MCNC: 21 MG/DL (ref 8–22)
CALCIUM SERPL-MCNC: 8.8 MG/DL (ref 8.5–10.5)
CHLORIDE SERPL-SCNC: 101 MMOL/L (ref 96–112)
CO2 SERPL-SCNC: 24 MMOL/L (ref 20–33)
COLOR UR: YELLOW
CREAT SERPL-MCNC: 0.85 MG/DL (ref 0.5–1.4)
EOSINOPHIL # BLD AUTO: 0.11 K/UL (ref 0–0.51)
EOSINOPHIL NFR BLD: 2.1 % (ref 0–6.9)
EPI CELLS #/AREA URNS HPF: NEGATIVE /HPF
ERYTHROCYTE [DISTWIDTH] IN BLOOD BY AUTOMATED COUNT: 43.4 FL (ref 35.9–50)
GLOBULIN SER CALC-MCNC: 2.5 G/DL (ref 1.9–3.5)
GLUCOSE BLD-MCNC: 150 MG/DL (ref 65–99)
GLUCOSE SERPL-MCNC: 120 MG/DL (ref 65–99)
GLUCOSE UR STRIP.AUTO-MCNC: NEGATIVE MG/DL
HCT VFR BLD AUTO: 47.3 % (ref 42–52)
HGB BLD-MCNC: 15.2 G/DL (ref 14–18)
HYALINE CASTS #/AREA URNS LPF: ABNORMAL /LPF
IMM GRANULOCYTES # BLD AUTO: 0.01 K/UL (ref 0–0.11)
IMM GRANULOCYTES NFR BLD AUTO: 0.2 % (ref 0–0.9)
KETONES UR STRIP.AUTO-MCNC: NEGATIVE MG/DL
LEUKOCYTE ESTERASE UR QL STRIP.AUTO: NEGATIVE
LYMPHOCYTES # BLD AUTO: 1.35 K/UL (ref 1–4.8)
LYMPHOCYTES NFR BLD: 25.9 % (ref 22–41)
MAGNESIUM SERPL-MCNC: 1.9 MG/DL (ref 1.5–2.5)
MCH RBC QN AUTO: 31 PG (ref 27–33)
MCHC RBC AUTO-ENTMCNC: 32.1 G/DL (ref 33.7–35.3)
MCV RBC AUTO: 96.5 FL (ref 81.4–97.8)
MICRO URNS: ABNORMAL
MONOCYTES # BLD AUTO: 0.78 K/UL (ref 0–0.85)
MONOCYTES NFR BLD AUTO: 14.9 % (ref 0–13.4)
NEUTROPHILS # BLD AUTO: 2.94 K/UL (ref 1.82–7.42)
NEUTROPHILS NFR BLD: 56.3 % (ref 44–72)
NITRITE UR QL STRIP.AUTO: NEGATIVE
NRBC # BLD AUTO: 0 K/UL
NRBC BLD-RTO: 0 /100 WBC
PH UR STRIP.AUTO: 5 [PH]
PHOSPHATE SERPL-MCNC: 4.2 MG/DL (ref 2.5–4.5)
PLATELET # BLD AUTO: 169 K/UL (ref 164–446)
PMV BLD AUTO: 9.9 FL (ref 9–12.9)
POTASSIUM SERPL-SCNC: 4.4 MMOL/L (ref 3.6–5.5)
PROT SERPL-MCNC: 6.1 G/DL (ref 6–8.2)
PROT UR QL STRIP: 30 MG/DL
RBC # BLD AUTO: 4.9 M/UL (ref 4.7–6.1)
RBC # URNS HPF: ABNORMAL /HPF
RBC UR QL AUTO: NEGATIVE
SODIUM SERPL-SCNC: 134 MMOL/L (ref 135–145)
SP GR UR STRIP.AUTO: 1.01
UROBILINOGEN UR STRIP.AUTO-MCNC: 1 MG/DL
WBC # BLD AUTO: 5.2 K/UL (ref 4.8–10.8)
WBC #/AREA URNS HPF: ABNORMAL /HPF

## 2019-02-01 PROCEDURE — A9270 NON-COVERED ITEM OR SERVICE: HCPCS | Performed by: HOSPITALIST

## 2019-02-01 PROCEDURE — 700102 HCHG RX REV CODE 250 W/ 637 OVERRIDE(OP): Performed by: INTERNAL MEDICINE

## 2019-02-01 PROCEDURE — A9270 NON-COVERED ITEM OR SERVICE: HCPCS | Performed by: INTERNAL MEDICINE

## 2019-02-01 PROCEDURE — 83735 ASSAY OF MAGNESIUM: CPT

## 2019-02-01 PROCEDURE — 84100 ASSAY OF PHOSPHORUS: CPT

## 2019-02-01 PROCEDURE — 82962 GLUCOSE BLOOD TEST: CPT

## 2019-02-01 PROCEDURE — 700102 HCHG RX REV CODE 250 W/ 637 OVERRIDE(OP): Performed by: HOSPITALIST

## 2019-02-01 PROCEDURE — 85025 COMPLETE CBC W/AUTO DIFF WBC: CPT

## 2019-02-01 PROCEDURE — 74018 RADEX ABDOMEN 1 VIEW: CPT

## 2019-02-01 PROCEDURE — 99217 PR OBSERVATION CARE DISCHARGE: CPT | Performed by: INTERNAL MEDICINE

## 2019-02-01 PROCEDURE — G0378 HOSPITAL OBSERVATION PER HR: HCPCS

## 2019-02-01 PROCEDURE — 80053 COMPREHEN METABOLIC PANEL: CPT

## 2019-02-01 PROCEDURE — 81001 URINALYSIS AUTO W/SCOPE: CPT

## 2019-02-01 PROCEDURE — 36415 COLL VENOUS BLD VENIPUNCTURE: CPT

## 2019-02-01 RX ORDER — CALCIUM CARBONATE 500 MG/1
500 TABLET, CHEWABLE ORAL EVERY 12 HOURS PRN
Status: DISCONTINUED | OUTPATIENT
Start: 2019-02-01 | End: 2019-02-01 | Stop reason: HOSPADM

## 2019-02-01 RX ADMIN — FUROSEMIDE 40 MG: 40 TABLET ORAL at 05:47

## 2019-02-01 RX ADMIN — CARVEDILOL 3.12 MG: 3.12 TABLET, FILM COATED ORAL at 15:09

## 2019-02-01 RX ADMIN — SENNOSIDES AND DOCUSATE SODIUM 2 TABLET: 8.6; 5 TABLET ORAL at 05:47

## 2019-02-01 RX ADMIN — RIVAROXABAN 15 MG: 15 TABLET, FILM COATED ORAL at 15:10

## 2019-02-01 RX ADMIN — ATORVASTATIN CALCIUM 80 MG: 80 TABLET, FILM COATED ORAL at 15:09

## 2019-02-01 RX ADMIN — RIVAROXABAN 15 MG: 15 TABLET, FILM COATED ORAL at 05:47

## 2019-02-01 RX ADMIN — CARVEDILOL 3.12 MG: 3.12 TABLET, FILM COATED ORAL at 05:47

## 2019-02-01 RX ADMIN — ANTACID TABLETS 500 MG: 500 TABLET, CHEWABLE ORAL at 01:42

## 2019-02-01 NOTE — DISCHARGE INSTRUCTIONS
Discharge Instructions    Discharged to home by car with relative. Discharged via wheelchair, hospital escort: Yes.  Special equipment needed: Not Applicable    Be sure to schedule a follow-up appointment with your primary care doctor or any specialists as instructed.     Discharge Plan:   Diet Plan: Discussed  Activity Level: Discussed  Confirmed Follow up Appointment: Patient to Call and Schedule Appointment  Confirmed Symptoms Management: Discussed  Medication Reconciliation Updated: Yes  Pneumococcal Vaccine Administered/Refused: Not given - Patient refused pneumococcal vaccine  Influenza Vaccine Indication: Patient Refuses    I understand that a diet low in cholesterol, fat, and sodium is recommended for good health. Unless I have been given specific instructions below for another diet, I accept this instruction as my diet prescription.   Other diet: Heart healthy    Special Instructions: None    · Is patient discharged on Warfarin / Coumadin?   No         Pulmonary Embolism  A pulmonary embolism (PE) is a sudden blockage or decrease of blood flow in one lung or both lungs. Most blockages come from a blood clot that travels from the legs or the pelvis to the lungs. PE is a dangerous and potentially life-threatening condition if it is not treated right away.  What are the causes?  A pulmonary embolism occurs most commonly when a blood clot travels from one of your veins to your lungs. Rarely, PE is caused by air, fat, amniotic fluid, or part of a tumor traveling through your veins to your lungs.  What increases the risk?  A PE is more likely to develop in:  · People who smoke.  · People who are older, especially over 60 years of age.  · People who are overweight (obese).  · People who sit or lie still for a long time, such as during long-distance travel (over 4 hours), bed rest, hospitalization, or during recovery from certain medical conditions like a stroke.  · People who do not engage in much physical  activity (sedentary lifestyle).  · People who have chronic breathing disorders.  · People who have a personal or family history of blood clots or blood clotting disease.  · People who have peripheral vascular disease (PVD), diabetes, or some types of cancer.  · People who have heart disease, especially if the person had a recent heart attack or has congestive heart failure.  · People who have neurological diseases that affect the legs (leg paresis).  · People who have had a traumatic injury, such as breaking a hip or leg.  · People who have recently had major or lengthy surgery, especially on the hip, knee, or abdomen.  · People who have had a central line placed inside a large vein.  · People who take medicines that contain the hormone estrogen. These include birth control pills and hormone replacement therapy.  · Pregnancy or during childbirth or the postpartum period.  What are the signs or symptoms?  The symptoms of a PE usually start suddenly and include:  · Shortness of breath while active or at rest.  · Coughing or coughing up blood or blood-tinged mucus.  · Chest pain that is often worse with deep breaths.  · Rapid or irregular heartbeat.  · Feeling light-headed or dizzy.  · Fainting.  · Feeling anxious.  · Sweating.  There may also be pain and swelling in a leg if that is where the blood clot started.  These symptoms may represent a serious problem that is an emergency. Do not wait to see if the symptoms will go away. Get medical help right away. Call your local emergency services (911 in the [place] Do not drive yourself to the hospital.   How is this diagnosed?  Your health care provider will take a medical history and perform a physical exam. You may also have other tests, including:  · Blood tests to assess the clotting properties of your blood, assess oxygen levels in your blood, and find blood clots.  · Imaging tests, such as CT, ultrasound, MRI, X-ray, and other tests to see if you have clots anywhere  in your body.  · An electrocardiogram (ECG) to look for heart strain from blood clots in the lungs.  How is this treated?  The main goals of PE treatment are:  · To stop a blood clot from growing larger.  · To stop new blood clots from forming.  The type of treatment that you receive depends on many factors, such as the cause of your PE, your risk for bleeding or developing more clots, and other medical conditions that you have. Sometimes, a combination of treatments is necessary.  This condition may be treated with:  · Medicines, including newer oral blood thinners (anticoagulants), warfarin, low molecular weight heparins, thrombolytics, or heparins.  · Wearing compression stockings or using different types of devices.  · Surgery (rare) to remove the blood clot or to place a filter in your abdomen to stop the blood clot from traveling to your lungs.  Treatments for a PE are often divided into immediate treatment, long-term treatment (up to 3 months after PE), and extended treatment (more than 3 months after PE). Your treatment may continue for several months. This is called maintenance therapy, and it is used to prevent the forming of new blood clots. You can work with your health care provider to choose the treatment program that is best for you.  What are anticoagulants?   Anticoagulants are medicines that treat PEs. They can stop current blood clots from growing and stop new clots from forming. They cannot dissolve existing clots. Your body dissolves clots by itself over time. Anticoagulants are given by mouth, by injection, or through an IV tube.  What are thrombolytics?   Thrombolytics are clot-dissolving medicines that are used to dissolve a PE. They carry a high risk of bleeding, so they tend to be used only in severe cases or if you have very low blood pressure.  Follow these instructions at home:  If you are taking a newer oral anticoagulant:  · Take the medicine every single day at the same time each  day.  · Understand what foods and drugs interact with this medicine.  · Understand that there are no regular blood tests required when using this medicine.  · Understand the side effects of this medicine, including excessive bruising or bleeding. Ask your health care provider or pharmacist about other possible side effects.  If you are taking warfarin:  · Understand how to take warfarin and know which foods can affect how warfarin works in your body.  · Understand that it is dangerous to take too much or too little warfarin. Too much warfarin increases the risk of bleeding. Too little warfarin continues to allow the risk for blood clots.  · Follow your PT and INR blood testing schedule. The PT and INR results allow your health care provider to adjust your dose of warfarin. It is very important that you have your PT and INR tested as often as told by your health care provider.  · Avoid major changes in your diet, or tell your health care provider before you change your diet. Arrange a visit with a registered dietitian to answer your questions. Many foods, especially foods that are high in vitamin K, can interfere with warfarin and affect the PT and INR results. Eat a consistent amount of foods that are high in vitamin K, such as:  ¨ Spinach, kale, broccoli, cabbage, sejal greens, turnip greens, [place] sprouts, peas, cauliflower, seaweed, and parsley.  ¨ Beef liver and pork liver.  ¨ Green tea.  ¨ Soybean oil.  · Tell your health care provider about any and all medicines, vitamins, and supplements that you take, including aspirin and other over-the-counter anti-inflammatory medicines. Be especially cautious with aspirin and anti-inflammatory medicines. Do not take those before you ask your health care provider if it is safe to do so. This is important because many medicines can interfere with warfarin and affect the PT and INR results.  · Do not start or stop taking any over-the-counter or prescription medicine  unless your health care provider or pharmacist tells you to do so.  If you take warfarin, you will also need to do these things:  · Hold pressure over cuts for longer than usual.  · Tell your dentist and other health care providers that you are taking warfarin before you have any procedures in which bleeding may occur.  · Avoid alcohol or drink very small amounts. Tell your health care provider if you change your alcohol intake.  · Do not use tobacco products, including cigarettes, chewing tobacco, and e-cigarettes. If you need help quitting, ask your health care provider.  · Avoid contact sports.  General instructions  · Take over-the-counter and prescription medicines only as told by your health care provider. Anticoagulant medicines can have side effects, including easy bruising and difficulty stopping bleeding. If you are prescribed an anticoagulant, you will also need to do these things:  ¨ Hold pressure over cuts for longer than usual.  ¨ Tell your dentist and other health care providers that you are taking anticoagulants before you have any procedures in which bleeding may occur.  ¨ Avoid contact sports.  · Wear a medical alert bracelet or carry a medical alert card that says you have had a PE.  · Ask your health care provider how soon you can go back to your normal activities. Stay active to prevent new blood clots from forming.  · Make sure to exercise while traveling or when you have been sitting or standing for a long period of time. It is very important to exercise. Exercise your legs by walking or by tightening and relaxing your leg muscles often. Take frequent walks.  · Wear compression stockings as told by your health care provider to help prevent more blood clots from forming.  · Do not use tobacco products, including cigarettes, chewing tobacco, and e-cigarettes. If you need help quitting, ask your health care provider.  · Keep all follow-up appointments with your health care provider. This is  important.  How is this prevented?  Take these actions to decrease your risk of developing another PE:  · Exercise regularly. For at least 30 minutes every day, engage in:  ¨ Activity that involves moving your arms and legs.  ¨ Activity that encourages good blood flow through your body by increasing your heart rate.  · Exercise your arms and legs every hour during long-distance travel (over 4 hours). Drink plenty of water and avoid drinking alcohol while traveling.  · Avoid sitting or lying in bed for long periods of time without moving your legs.  · Maintain a weight that is appropriate for your height. Ask your health care provider what weight is healthy for you.  · If you are a woman who is over 35 years of age, avoid unnecessary use of medicines that contain estrogen. These include birth control pills.  · Do not smoke, especially if you take estrogen medicines. If you need help quitting, ask your health care provider.  · If you are at very high risk for PE, wear compression stockings.  · If you recently had a PE, have regularly scheduled ultrasound testing on your legs to check for new blood clots.  If you are hospitalized, prevention measures may include:  · Early walking after surgery, as soon as your health care provider says that it is safe.  · Receiving anticoagulants to prevent blood clots. If you cannot take anticoagulants, other options may be available, such as wearing compression stockings or using different types of devices.  Get help right away if:  · You have new or increased pain, swelling, or redness in an arm or leg.  · You have numbness or tingling in an arm or leg.  · You have shortness of breath while active or at rest.  · You have chest pain.  · You have a rapid or irregular heartbeat.  · You feel light-headed or dizzy.  · You cough up blood.  · You notice blood in your vomit, bowel movement, or urine.  · You have a fever.  These symptoms may represent a serious problem that is an emergency.  Do not wait to see if the symptoms will go away. Get medical help right away. Call your local emergency services (911 in the [place] Do not drive yourself to the hospital.   This information is not intended to replace advice given to you by your health care provider. Make sure you discuss any questions you have with your health care provider.  Document Released: 12/15/2001 Document Revised: 05/25/2017 Document Reviewed: 04/13/2016  [employer] Interactive Patient Education © 2017 [employer]      Depression / Suicide Risk    As you are discharged from this [facility] facility, it is important to learn how to keep safe from harming yourself.    Recognize the warning signs:  · Abrupt changes in personality, positive or negative- including increase in energy   · Giving away possessions  · Change in eating patterns- significant weight changes-  positive or negative  · Change in sleeping patterns- unable to sleep or sleeping all the time   · Unwillingness or inability to communicate  · Depression  · Unusual sadness, discouragement and loneliness  · Talk of wanting to die  · Neglect of personal appearance   · Rebelliousness- reckless behavior  · Withdrawal from people/activities they love  · Confusion- inability to concentrate     If you or a loved one observes any of these behaviors or has concerns about self-harm, here's what you can do:  · Talk about it- your feelings and reasons for harming yourself  · Remove any means that you might use to hurt yourself (examples: pills, rope, extension cords, firearm)  · Get professional help from the community (Mental Health, Substance Abuse, psychological counseling)  · Do not be alone:Call your Safe Contact- someone whom you trust who will be there for you.  · Call your local CRISIS HOTLINE 287-1958 or 837-505-1003  · Call your local Children's Mobile Crisis Response Team [place] (699) 185-5099 or www.[employer]  · Call the toll free National Suicide Prevention  Hotlines   · National Suicide Prevention Lifeline 182-457-EILQ (4598)  · National Hope Line Network 800-SUICIDE (886-2196)

## 2019-02-01 NOTE — PROGRESS NOTES
Bedside report received from Mulu OTERO. Patient's chart and MAR reviewed. 12 hour chart check complete. Pt is awake in bed. Pt is AOx4. Patient was updated on plan of care for the day. Questions answered and concerns addressed.  Pt denies any additional needs at this time. White board updated. Call light and personal belongings within reach, bed in lowest position.

## 2019-02-01 NOTE — DISCHARGE PLANNING
RN ROLAN spoke with pt about going to fill his Xarelto at the [facility] pharmacy.  Pt is able to go and fill his prescriptions at [facility] pharmacy.  Pt is aware that he needs to see his PCP at [facility] to have them write the new script and then fill at [facility] pharmacy.     [facility] pharmacy is closed Saturday and Sunday.  Pt will be unable to fill his Xarelto until Monday at [facility].       CM faxed Xarelto scripts to [facility] pharmacy off of [address] with a 30-day free trial card to see if pt can qualify.    CM will f/u with pharmacy

## 2019-02-01 NOTE — RESPIRATORY CARE
COPD EDUCATION by COPD CLINICAL EDUCATOR  2/1/2019 at 7:16 AM by Dorothea Cabezas     Patient reviewed by COPD education team. Patient does not qualify for COPD program.

## 2019-02-01 NOTE — PROGRESS NOTES
Pt dc'd home. IV and monitor removed; monitor room notified. Pt left unit via wheelchair with RN. Personal belongings with pt when leaving unit. Pt given discharge instructions prior to leaving unit including prescription and when to visit with physician; verbalizes understanding. Copy of discharge instructions with pt and in the chart.

## 2019-02-01 NOTE — CARE PLAN
Problem: Safety  Goal: Will remain free from injury  Safety and fall precautions in place, bed in lowest position. Pt room near nursing station. Bed alarm on.        Problem: Knowledge Deficit  Goal: Knowledge of disease process/condition, treatment plan, diagnostic tests, and medications will improve  Bedside report received.  POC discussed with patient, addressed their questions and concerns to the best of my ability.

## 2019-02-01 NOTE — DISCHARGE PLANNING
RN CM faxed paper script for pt Xarelto to pt pharmacy, [facility]     CM to f/u with co-pay and possible prior auth

## 2019-02-01 NOTE — DISCHARGE PLANNING
RN CM called [facility] pharmacy.    Faxed prescription for 15 mg Xarelto.  Requested free samples if available.      Pharmacy is able to fill with free samples, a pack of 42 tab of 15 mg xarelto  9 tabs of 20 mg tabs in sample pack. Is ready for .      Pt is aware that he will have to fill the 20 mg prescription at [facility].      PT is aware and instructions were given with address and phone number with the hard copy of the prescription for the [facility]

## 2019-02-01 NOTE — PROGRESS NOTES
"Pt started complaining of 7/10 abdominal pain and bloating. Pt stated \"this is exactly what happened when I came in. I need lasix\". RN to assess pt. Lungs are clear throughout. Abdomen hyperactive, firm and distended. Pt refusing morphine for pain, states \"it's not good for my heart\". MD aware, orders received for KUB and tums for abdominal discomfort.   "

## 2019-02-01 NOTE — DISCHARGE PLANNING
RN CM called pt Pharmacy, [facility] and they can't fill the pts Xarelto from an outside provider.    CM was told that Pt can take the paper scripts to [facility] clinic today and have one of their doctors re-write the script and to have filled today.      CM will discuss with doctor and pt for plan

## 2019-02-02 NOTE — DISCHARGE SUMMARY
Discharge Summary    CHIEF COMPLAINT ON ADMISSION  Chief Complaint   Patient presents with   • Chest Pressure     heaviness    • Abdominal Swelling   • Shortness of Breath       Reason for Admission  Chest Pain; Leg Swelling     Admission Date  1/29/2019    CODE STATUS  Full Code    HPI & HOSPITAL COURSE  This is a 43 y.o. male with known history of alcoholic cardiomyopathy with ejection fraction 15%, admitted with complaints of shortness of breath and chest pain.  Subsequent evaluation revealed small bilateral lower lobe pulmonary emboli.  Patient was started on Xarelto.  Repeat echo showed no significant change since a recent echo that was done 1/16 at [place].  Patient recommended to follow with his regular cardiologist at [facility], as well as PCP.  Prescriptions for Xarelto provided.       Therefore, he is discharged in fair and stable condition to home with close outpatient follow-up.        Discharge Date  2/1/2019    FOLLOW UP ITEMS POST DISCHARGE  Cardiology  PCP    DISCHARGE DIAGNOSES  Principal Problem:    Pain in the chest POA: Unknown  Active Problems:    Hypotension POA: Unknown    HLD (hyperlipidemia) POA: Unknown    CHF (congestive heart failure) (HCC) POA: Unknown    Abdominal distention POA: Unknown    Acute pulmonary embolism (HCC) POA: Unknown    Acute respiratory failure with hypoxia (HCC) POA: Unknown  Resolved Problems:    * No resolved hospital problems. *      FOLLOW UP  No future appointments.  [address] [address] [phone]  In 1 week  Hospital follow up    [address] [address] [phone]    PLease call to schedule a new Primary Care Provider appointment, [facility] has a sliding fee scale thank you       MEDICATIONS ON DISCHARGE     Medication List      START taking these medications      Instructions   * rivaroxaban 15 MG Tabs tablet  Commonly known as:  XARELTO   Take 1 Tab by  mouth 2 times a day, with meals.  Dose:  15 mg     * rivaroxaban 15 MG Tabs tablet  Commonly known as:  XARELTO   Take 1 Tab by mouth 2 Times a Day.  Dose:  15 mg     * rivaroxaban 20 MG Tabs tablet  Start taking on:  2/20/2019  Commonly known as:  XARELTO   Take 1 Tab by mouth with dinner.  Dose:  20 mg        * This list has 3 medication(s) that are the same as other medications prescribed for you. Read the directions carefully, and ask your doctor or other care provider to review them with you.            CONTINUE taking these medications      Instructions   atorvastatin 10 MG Tabs  Commonly known as:  LIPITOR   Take 10 mg by mouth every evening.  Dose:  10 mg     carvedilol 3.125 MG Tabs  Commonly known as:  COREG   Take 3.125 mg by mouth 2 times a day, with meals.  Dose:  3.125 mg     ENTRESTO 24-26 MG Tabs tablet  Generic drug:  sacubitril-valsartan   Take 1 Tab by mouth 2 Times a Day.  Dose:  1 Tab     furosemide 40 MG Tabs  Commonly known as:  LASIX   Take 40 mg by mouth 1 time daily as needed.  Dose:  40 mg     LEVEMIR FLEXPEN 100 UNIT/ML Sopn injection  Generic drug:  insulin detemir   Inject 20 Units as instructed 1 time daily as needed (if BS levels are high per pt).  Dose:  20 Units     metformin 1000 MG tablet  Commonly known as:  GLUCOPHAGE   Take 1,000 mg by mouth 2 times a day, with meals.  Dose:  1000 mg     spironolactone 25 MG Tabs  Commonly known as:  ALDACTONE   Take 12.5-25 mg by mouth 1 time daily as needed.  Dose:  12.5-25 mg        STOP taking these medications    aspirin 81 MG tablet     potassium chloride SA 20 MEQ Tbcr  Commonly known as:  Kdur            Allergies  No Known Allergies    DIET  Orders Placed This Encounter   Procedures   • Diet Order Cardiac     Standing Status:   Standing     Number of Occurrences:   1     Order Specific Question:   Diet:     Answer:   Cardiac [6]     Order Specific Question:   Electrolyte modifications:     Answer:   No Added Salt [2]       ACTIVITY  As  tolerated.  Weight bearing as tolerated    CONSULTATIONS  None    PROCEDURES  None    LABORATORY  Lab Results   Component Value Date    SODIUM 134 (L) 02/01/2019    POTASSIUM 4.4 02/01/2019    CHLORIDE 101 02/01/2019    CO2 24 02/01/2019    GLUCOSE 120 (H) 02/01/2019    BUN 21 02/01/2019    CREATININE 0.85 02/01/2019        Lab Results   Component Value Date    WBC 5.2 02/01/2019    HEMOGLOBIN 15.2 02/01/2019    HEMATOCRIT 47.3 02/01/2019    PLATELETCT 169 02/01/2019      XX-ALMOFSM-0 VIEW   Final Result      There is no large or small bowel loop dilatation. There is no free air in the abdomen.      EC-ECHOCARDIOGRAM COMPLETE W/O CONT   Final Result      US-ABDOMEN COMPLETE SURVEY   Final Result      1.  Small amount of ascites      2.  Fatty infiltration of liver and hepatomegaly      3.  Bilateral pleural effusion are identified         CT-CTA CHEST PULMONARY ARTERY W/ RECONS   Final Result   Addendum 1 of 1   These findings were discussed with SHAKEEL HARRIS on 1/30/2019 2:06    PM.      Final      1.  Small bilateral lower lobe pulmonary emboli in the posterior basal subsegmental pulmonary artery branches.   2.  Small bilateral pleural effusions with associated atelectasis, worse in the LEFT.   3.  LEFT ventricular enlargement.   4.  Pacemaker in place.   5.  Mild mediastinal adenopathy, nonspecific.   6.  Small amount of ascites noted in the upper abdomen.            DX-CHEST-LIMITED (1 VIEW)   Final Result         No acute cardiopulmonary abnormalities are identified.            Total time of the discharge process exceeds 47 minutes.

## 2022-12-27 ENCOUNTER — TELEPHONE (OUTPATIENT)
Dept: HEALTH INFORMATION MANAGEMENT | Facility: OTHER | Age: 47
End: 2022-12-27
Payer: MEDICAID

## 2023-01-15 NOTE — ED NOTES
Pt up to bathroom with RN assistance. Pt escorted back to bed with RN, respirations noted to be increasingly more labored and pt c/o exertional sob.  Pt placed back on cardiac monitor, pt o2 sat maintained at 93%, will continue to monitor closely. Pt provided with breakfast tray.    Family

## 2023-03-06 ENCOUNTER — TELEPHONE (OUTPATIENT)
Dept: HEALTH INFORMATION MANAGEMENT | Facility: OTHER | Age: 48
End: 2023-03-06
Payer: MEDICAID

## 2023-12-22 NOTE — PROGRESS NOTES
"Pt SOB with talking and at rest. On RA, sats high 90s. IV lasix given as ordered. Primofit on, output 1050cc clear/yellow since arrival to unit. 1800mL fluid restriction maintained. BP high, then dropped to 90/50, recheck 101/50. See vitals flowsheet. Hospitalist notified. Lung sounds diminished. BLE +3 edema. Ax1 with cane. On tele, A fib.     Approx 2130, pt having harder time breathing, \"feels like a big weight on my chest.\" Pt SOB, taking a breathe in between each word. Denies chest pain, dizziness, lightheadedness. On RA, sats mid-high 90s. VSS. House officer paged. Labs ordered, ECG. Will continue to monitor pt.       Problem: Adult Inpatient Plan of Care  Goal: Plan of Care Review  Description: The Plan of Care Review/Shift note should be completed every shift.  The Outcome Evaluation is a brief statement about your assessment that the patient is improving, declining, or no change.  This information will be displayed automatically on your shift  note.  12/21/2023 2239 by Lola Ayala, RN  Outcome: Progressing  12/21/2023 2238 by Lola Ayala, RN  Outcome: Progressing     Problem: Gas Exchange Impaired  Goal: Optimal Gas Exchange  Outcome: Progressing     Problem: Fall Injury Risk  Goal: Absence of Fall and Fall-Related Injury  Outcome: Progressing   Goal Outcome Evaluation:                        " Report received from previous nurse. Patient encouraged to call for assistance. Call light within reach. Bed locked and in lowest position.